# Patient Record
Sex: MALE | Race: BLACK OR AFRICAN AMERICAN | NOT HISPANIC OR LATINO | Employment: FULL TIME | URBAN - METROPOLITAN AREA
[De-identification: names, ages, dates, MRNs, and addresses within clinical notes are randomized per-mention and may not be internally consistent; named-entity substitution may affect disease eponyms.]

---

## 2023-12-04 ENCOUNTER — HOSPITAL ENCOUNTER (EMERGENCY)
Facility: HOSPITAL | Age: 38
Discharge: HOME | End: 2023-12-04
Attending: STUDENT IN AN ORGANIZED HEALTH CARE EDUCATION/TRAINING PROGRAM

## 2023-12-04 VITALS
RESPIRATION RATE: 18 BRPM | HEART RATE: 50 BPM | WEIGHT: 110 LBS | TEMPERATURE: 98 F | HEIGHT: 62 IN | DIASTOLIC BLOOD PRESSURE: 91 MMHG | SYSTOLIC BLOOD PRESSURE: 157 MMHG | BODY MASS INDEX: 20.24 KG/M2 | OXYGEN SATURATION: 99 %

## 2023-12-04 DIAGNOSIS — K64.9 HEMORRHOIDS, UNSPECIFIED HEMORRHOID TYPE: Primary | ICD-10-CM

## 2023-12-04 LAB
ALBUMIN SERPL-MCNC: 4.5 G/DL (ref 3.5–5)
ALBUMIN/GLOB SERPL: 1.5 RATIO (ref 1.1–2)
ALP SERPL-CCNC: 70 UNIT/L (ref 40–150)
ALT SERPL-CCNC: 11 UNIT/L (ref 0–55)
AST SERPL-CCNC: 22 UNIT/L (ref 5–34)
BASOPHILS # BLD AUTO: 0.1 X10(3)/MCL
BASOPHILS NFR BLD AUTO: 1.1 %
BILIRUB SERPL-MCNC: 0.2 MG/DL
BUN SERPL-MCNC: 8.6 MG/DL (ref 8.9–20.6)
CALCIUM SERPL-MCNC: 9.9 MG/DL (ref 8.4–10.2)
CHLORIDE SERPL-SCNC: 107 MMOL/L (ref 98–107)
CO2 SERPL-SCNC: 29 MMOL/L (ref 22–29)
CREAT SERPL-MCNC: 0.82 MG/DL (ref 0.73–1.18)
EOSINOPHIL # BLD AUTO: 0.37 X10(3)/MCL (ref 0–0.9)
EOSINOPHIL NFR BLD AUTO: 4.2 %
ERYTHROCYTE [DISTWIDTH] IN BLOOD BY AUTOMATED COUNT: 15.5 % (ref 11.5–17)
GFR SERPLBLD CREATININE-BSD FMLA CKD-EPI: >60 MLS/MIN/1.73/M2
GLOBULIN SER-MCNC: 3.1 GM/DL (ref 2.4–3.5)
GLUCOSE SERPL-MCNC: 100 MG/DL (ref 74–100)
HCT VFR BLD AUTO: 43.2 % (ref 42–52)
HGB BLD-MCNC: 14 G/DL (ref 14–18)
IMM GRANULOCYTES # BLD AUTO: 0.02 X10(3)/MCL (ref 0–0.04)
IMM GRANULOCYTES NFR BLD AUTO: 0.2 %
LYMPHOCYTES # BLD AUTO: 4.72 X10(3)/MCL (ref 0.6–4.6)
LYMPHOCYTES NFR BLD AUTO: 53.6 %
MCH RBC QN AUTO: 26.7 PG (ref 27–31)
MCHC RBC AUTO-ENTMCNC: 32.4 G/DL (ref 33–36)
MCV RBC AUTO: 82.3 FL (ref 80–94)
MONOCYTES # BLD AUTO: 0.56 X10(3)/MCL (ref 0.1–1.3)
MONOCYTES NFR BLD AUTO: 6.4 %
NEUTROPHILS # BLD AUTO: 3.04 X10(3)/MCL (ref 2.1–9.2)
NEUTROPHILS NFR BLD AUTO: 34.5 %
NRBC BLD AUTO-RTO: 0 %
PLATELET # BLD AUTO: 232 X10(3)/MCL (ref 130–400)
PMV BLD AUTO: 10.8 FL (ref 7.4–10.4)
POTASSIUM SERPL-SCNC: 3.6 MMOL/L (ref 3.5–5.1)
PROT SERPL-MCNC: 7.6 GM/DL (ref 6.4–8.3)
RBC # BLD AUTO: 5.25 X10(6)/MCL (ref 4.7–6.1)
SODIUM SERPL-SCNC: 142 MMOL/L (ref 136–145)
WBC # SPEC AUTO: 8.81 X10(3)/MCL (ref 4.5–11.5)

## 2023-12-04 PROCEDURE — 99283 EMERGENCY DEPT VISIT LOW MDM: CPT

## 2023-12-04 PROCEDURE — 80053 COMPREHEN METABOLIC PANEL: CPT

## 2023-12-04 PROCEDURE — 85025 COMPLETE CBC W/AUTO DIFF WBC: CPT

## 2023-12-04 RX ORDER — DOCUSATE SODIUM 100 MG/1
100 CAPSULE, LIQUID FILLED ORAL DAILY
Qty: 30 CAPSULE | Refills: 0 | Status: SHIPPED | OUTPATIENT
Start: 2023-12-04 | End: 2024-01-03

## 2023-12-04 RX ORDER — HYDROCORTISONE ACETATE 25 MG/1
25 SUPPOSITORY RECTAL 2 TIMES DAILY
Qty: 14 SUPPOSITORY | Refills: 0 | Status: SHIPPED | OUTPATIENT
Start: 2023-12-04 | End: 2023-12-11

## 2023-12-05 NOTE — FIRST PROVIDER EVALUATION
"Medical screening examination initiated.  I have conducted a focused provider triage encounter, findings are as follows:    Brief history of present illness:  38 year old male presents to the ER for evaluation of increased hemorrhoid bleeding x 1 day. Reports stood up yesterday and pants were "full of blood." Reports history of hemorrhoids in the past. Denies any OTC medications or topicals. Denies any OP follow up for hemorrhoids.    Vitals:    12/04/23 2004   BP: (!) 157/91   Pulse: (!) 50   Resp: 19   Temp: 97.5 °F (36.4 °C)   TempSrc: Oral   SpO2: 99%   Weight: 49.9 kg (110 lb)   Height: 5' 2" (1.575 m)       Pertinent physical exam:  alert, nonlabored, ambulatory    Brief workup plan:  labs, eval    Preliminary workup initiated; this workup will be continued and followed by the physician or advanced practice provider that is assigned to the patient when roomed.  "

## 2023-12-05 NOTE — ED PROVIDER NOTES
Encounter Date: 12/4/2023       History     Chief Complaint   Patient presents with    Hemorrhoids     States bleeding from his hemorrhoids have worsened over the last few days, has not utlizied any OTC meds, states he has had them for several mos, has not seen anyone for them prior. Denies dizziness, CP, SOB.      The patient is a 38 y.o. male who presents to the Emergency Department with a chief complaint of hemorrhoids. Patient states he has had hemorrhoids for multiple months but they have not been causing him any issues. He states this week he noticed some intermittent bleeding from the hemorrhoids when having a bowel movement.  Symptoms began 1 week ago and have been intermittent since onset. His pain is currently rated as a 0/10 in severity. Associated symptoms include noting. Symptoms are aggravated with bowel movements and there are no alleviating factors. The patient denies abdominal pain, nausea, or vomiting. He reports taking nothing prior to arrival with no relief of symptoms. No other reported symptoms at this time     The history is provided by the patient. No  was used.   Rectal Bleeding   The current episode started several days ago. The onset is undetermined. The problem occurs occasionally. The problem has been unchanged. The pain is at a severity of 0/10. Associated symptoms include hemorrhoids. Pertinent negatives include no fever, no nausea, no chest pain and no rash. Urine output has been normal. The last void occurred Less than 6 hours ago. His past medical history does not include abdominal surgery, developmental delay, Hirschsprung's disease, inflammatory bowel disease, recent abdominal injury, recent antibiotic use, recent change in diet or a recent illness. There were sick contacts none. He has received no recent medical care.     Review of patient's allergies indicates:  No Known Allergies  History reviewed. No pertinent past medical history.  History reviewed. No  pertinent surgical history.  History reviewed. No pertinent family history.  Social History     Tobacco Use    Smoking status: Unknown     Review of Systems   Constitutional:  Negative for fever.   HENT:  Negative for sore throat.    Respiratory:  Negative for shortness of breath.    Cardiovascular:  Negative for chest pain.   Gastrointestinal:  Positive for hematochezia and hemorrhoids. Negative for nausea.        Hemorrhoids    Genitourinary:  Negative for dysuria.   Musculoskeletal:  Negative for back pain.   Skin:  Negative for rash.   Neurological:  Negative for weakness.   Hematological:  Does not bruise/bleed easily.   All other systems reviewed and are negative.      Physical Exam     Initial Vitals [12/04/23 2004]   BP Pulse Resp Temp SpO2   (!) 157/91 (!) 50 19 97.5 °F (36.4 °C) 99 %      MAP       --         Physical Exam    Nursing note and vitals reviewed.  Constitutional: Vital signs are normal. He appears well-developed and well-nourished.   HENT:   Head: Normocephalic.   Right Ear: Hearing and tympanic membrane normal.   Left Ear: Hearing and tympanic membrane normal.   Mouth/Throat: Uvula is midline, oropharynx is clear and moist and mucous membranes are normal.   Cardiovascular:  Normal rate, regular rhythm, normal heart sounds and normal pulses.           Pulmonary/Chest: Effort normal and breath sounds normal.   Abdominal: Abdomen is soft. Bowel sounds are normal. There is no abdominal tenderness.   Genitourinary: Rectum:      Internal hemorrhoid present.      No rectal mass or tenderness.      Genitourinary Comments: No active bleeding present. No thrombosed hemorrhoids on exam. No rectal tenderness      Musculoskeletal:      Cervical back: No spinous process tenderness or muscular tenderness.     Lymphadenopathy:     He has no cervical adenopathy.   Neurological: He is alert. GCS eye subscore is 4. GCS verbal subscore is 5. GCS motor subscore is 6.   Skin: Skin is warm, dry and intact.  Capillary refill takes less than 2 seconds.         ED Course   Procedures  Labs Reviewed   COMPREHENSIVE METABOLIC PANEL - Abnormal; Notable for the following components:       Result Value    Blood Urea Nitrogen 8.6 (*)     All other components within normal limits   CBC WITH DIFFERENTIAL - Abnormal; Notable for the following components:    MCH 26.7 (*)     MCHC 32.4 (*)     MPV 10.8 (*)     Lymph # 4.72 (*)     All other components within normal limits   CBC W/ AUTO DIFFERENTIAL    Narrative:     The following orders were created for panel order CBC Auto Differential.  Procedure                               Abnormality         Status                     ---------                               -----------         ------                     CBC with Differential[[id number]]       Abnormal            Final result                 Please view results for these tests on the individual orders.          Imaging Results    None          Medications - No data to display  Medical Decision Making  The patient is a 38 y.o. male who presents to the Emergency Department with a chief complaint of hemorrhoids. Patient states he has had hemorrhoids for multiple months but they have not been causing him any issues. He states this week he noticed some intermittent bleeding from the hemorrhoids.  Symptoms began 1 week ago and have been intermittent since onset. His pain is currently rated as a 0/10 in severity. Associated symptoms include noting. Symptoms are aggravated with bowel movements and there are no alleviating factors. The patient denies abdominal pain, nausea, or vomiting. He reports taking nothing prior to arrival with no relief of symptoms. No other reported symptoms at this time     Differential diagnosis include but are not limited to internal hemorrhoids, external hemorrhoids     Problems Addressed:  Hemorrhoids, unspecified hemorrhoid type: acute illness or injury    Risk  OTC drugs.  Prescription drug  management.               ED Course as of 12/04/23 2213   Mon Dec 04, 2023   2211 I discussed results including follow up in detail with the patient.  Patient's labs unremarkable with the emergency department today.  He has no active bleeding exam.  Will discharge home with suppositories and have patient follow up with primary care provider.  He is amenable to this plan and ready for discharge home.  He was given very strict ER return precautions. [LM]      ED Course User Index  [LM] Vance Valenzuela NP                           Clinical Impression:  Final diagnoses:  [K64.9] Hemorrhoids, unspecified hemorrhoid type (Primary)          ED Disposition Condition    Discharge Stable          ED Prescriptions       Medication Sig Dispense Start Date End Date Auth. Provider    hydrocortisone (ANUSOL-HC) 25 mg suppository Place 1 suppository (25 mg total) rectally 2 (two) times daily. for 7 days 14 suppository 12/4/2023 12/11/2023 Vance Valenzuela NP    docusate sodium (COLACE) 100 MG capsule Take 1 capsule (100 mg total) by mouth once daily. 30 capsule 12/4/2023 1/3/2024 Vance Valenzuela NP          Follow-up Information       Follow up With Specialties Details Why Contact Info    Please contact [phone] to establish care with a primary care provider  Schedule an appointment as soon as possible for a visit                Vance Valenzuela NP  12/04/23 2213